# Patient Record
Sex: FEMALE | Race: WHITE | Employment: UNEMPLOYED | URBAN - METROPOLITAN AREA
[De-identification: names, ages, dates, MRNs, and addresses within clinical notes are randomized per-mention and may not be internally consistent; named-entity substitution may affect disease eponyms.]

---

## 2024-03-07 DIAGNOSIS — E03.9 ACQUIRED HYPOTHYROIDISM: Primary | ICD-10-CM

## 2024-03-07 RX ORDER — SOTALOL HYDROCHLORIDE 80 MG/1
120 TABLET ORAL 2 TIMES DAILY
COMMUNITY

## 2024-03-07 RX ORDER — SACUBITRIL AND VALSARTAN 24; 26 MG/1; MG/1
1 TABLET, FILM COATED ORAL 2 TIMES DAILY
COMMUNITY

## 2024-03-07 RX ORDER — ASPIRIN 81 MG/1
81 TABLET ORAL
COMMUNITY
Start: 2023-08-07

## 2024-03-07 RX ORDER — LEVOTHYROXINE SODIUM 112 UG/1
112 TABLET ORAL DAILY
COMMUNITY
End: 2024-03-07 | Stop reason: SDUPTHER

## 2024-03-07 RX ORDER — SPIRONOLACTONE 25 MG/1
25 TABLET ORAL DAILY
COMMUNITY

## 2024-03-07 RX ORDER — MEXILETINE HYDROCHLORIDE 150 MG/1
150 CAPSULE ORAL 3 TIMES DAILY
COMMUNITY

## 2024-03-07 RX ORDER — METOPROLOL SUCCINATE 50 MG/1
50 TABLET, EXTENDED RELEASE ORAL
COMMUNITY
Start: 2024-03-05

## 2024-03-07 RX ORDER — OMEPRAZOLE 20 MG/1
20 CAPSULE, DELAYED RELEASE ORAL
COMMUNITY
Start: 2023-04-24

## 2024-03-08 RX ORDER — LEVOTHYROXINE SODIUM 112 UG/1
112 TABLET ORAL DAILY
Qty: 90 TABLET | Refills: 0 | Status: SHIPPED | OUTPATIENT
Start: 2024-03-08

## 2024-08-28 ENCOUNTER — TELEPHONE (OUTPATIENT)
Dept: PRIMARY CARE | Facility: CLINIC | Age: 69
End: 2024-08-28
Payer: MEDICARE

## 2024-08-28 DIAGNOSIS — E03.9 ACQUIRED HYPOTHYROIDISM: ICD-10-CM

## 2024-08-28 DIAGNOSIS — Z00.00 WELLNESS EXAMINATION: Primary | ICD-10-CM

## 2024-08-28 RX ORDER — LEVOTHYROXINE SODIUM 112 UG/1
112 TABLET ORAL DAILY
Qty: 30 TABLET | Refills: 0 | Status: SHIPPED | OUTPATIENT
Start: 2024-08-28

## 2024-08-28 NOTE — TELEPHONE ENCOUNTER
Pt came in today stating that she needs a refill of her levothyroxine. She stated that she had her add a TSH to check her thyroid level but there is nothing in the chart and she has not been seen in over a year.

## 2024-09-23 ENCOUNTER — APPOINTMENT (OUTPATIENT)
Dept: PRIMARY CARE | Facility: CLINIC | Age: 69
End: 2024-09-23
Payer: MEDICARE

## 2024-09-23 VITALS
BODY MASS INDEX: 27.11 KG/M2 | DIASTOLIC BLOOD PRESSURE: 50 MMHG | WEIGHT: 153 LBS | HEART RATE: 104 BPM | TEMPERATURE: 97.7 F | SYSTOLIC BLOOD PRESSURE: 98 MMHG | HEIGHT: 63 IN

## 2024-09-23 DIAGNOSIS — D86.0 SARCOIDOSIS OF LUNG (MULTI): ICD-10-CM

## 2024-09-23 DIAGNOSIS — R21 RASH: ICD-10-CM

## 2024-09-23 DIAGNOSIS — E03.9 ACQUIRED HYPOTHYROIDISM: ICD-10-CM

## 2024-09-23 DIAGNOSIS — Z00.00 MEDICARE ANNUAL WELLNESS VISIT, SUBSEQUENT: Primary | ICD-10-CM

## 2024-09-23 DIAGNOSIS — Z12.11 ENCOUNTER FOR SCREENING FOR MALIGNANT NEOPLASM OF COLON: ICD-10-CM

## 2024-09-23 PROCEDURE — 99497 ADVNCD CARE PLAN 30 MIN: CPT | Performed by: FAMILY MEDICINE

## 2024-09-23 PROCEDURE — 99214 OFFICE O/P EST MOD 30 MIN: CPT | Performed by: FAMILY MEDICINE

## 2024-09-23 PROCEDURE — 1123F ACP DISCUSS/DSCN MKR DOCD: CPT | Performed by: FAMILY MEDICINE

## 2024-09-23 PROCEDURE — G0444 DEPRESSION SCREEN ANNUAL: HCPCS | Performed by: FAMILY MEDICINE

## 2024-09-23 PROCEDURE — 1158F ADVNC CARE PLAN TLK DOCD: CPT | Performed by: FAMILY MEDICINE

## 2024-09-23 PROCEDURE — 3008F BODY MASS INDEX DOCD: CPT | Performed by: FAMILY MEDICINE

## 2024-09-23 PROCEDURE — G0446 INTENS BEHAVE THER CARDIO DX: HCPCS | Performed by: FAMILY MEDICINE

## 2024-09-23 PROCEDURE — 1159F MED LIST DOCD IN RCRD: CPT | Performed by: FAMILY MEDICINE

## 2024-09-23 PROCEDURE — 1036F TOBACCO NON-USER: CPT | Performed by: FAMILY MEDICINE

## 2024-09-23 PROCEDURE — G0439 PPPS, SUBSEQ VISIT: HCPCS | Performed by: FAMILY MEDICINE

## 2024-09-23 RX ORDER — CLOTRIMAZOLE AND BETAMETHASONE DIPROPIONATE 10; .64 MG/G; MG/G
1 CREAM TOPICAL 2 TIMES DAILY
Qty: 45 G | Refills: 0 | Status: SHIPPED | OUTPATIENT
Start: 2024-09-23 | End: 2024-11-22

## 2024-09-23 RX ORDER — LEVOTHYROXINE SODIUM 112 UG/1
112 TABLET ORAL DAILY
Qty: 90 TABLET | Refills: 1 | Status: SHIPPED | OUTPATIENT
Start: 2024-09-23

## 2024-09-23 ASSESSMENT — PATIENT HEALTH QUESTIONNAIRE - PHQ9
1. LITTLE INTEREST OR PLEASURE IN DOING THINGS: NOT AT ALL
SUM OF ALL RESPONSES TO PHQ9 QUESTIONS 1 AND 2: 0
2. FEELING DOWN, DEPRESSED OR HOPELESS: NOT AT ALL

## 2024-09-23 NOTE — PROGRESS NOTES
Advance Care Planning Note     Discussion Date: 09/23/24   Discussion Participants: patient    The patient wishes to discuss Advance Care Planning today and the following is a brief summary of our discussion.     Patient has capacity to make their own medical decisions: Yes  Health Care Agent/Surrogate Decision Maker documented in chart: Yes    Documents on file and valid:  Advance Directive/Living Will: Yes   Health Care Power of : Yes  Other: matilde    Communication of Medical Status/Prognosis:   tbs     Communication of Treatment Goals/Options:   tbs     Treatment Decisions  Goals of Care: survival is paramount regardless of prognosis, treatment outcome, or burden   tbs  Follow Up Plan  tbs  Team Members  tbs  Time Statement: Total face to face time spent on advance care planning was >15 minutes with 16 minutes spent in counseling, including the explanation.  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  Greater than 10 minutes discussed.  PHQ-9 greater than 5 minutes reviewed    Ariel Taylor, DO  9/23/2024 12:49 PM    Patient is here for follow-up needs refill of her thyroid medication she just had TSH redone.  Just saw her cardiologist and is stable on her blood pressure and cardiac medications.  Patient also was used to moving a bunch of brush and got a rash that was scratchy and somewhat papular on her anterior abdomen.    REVIEW OF SYSTEMS: 12 systems negative except for those mentioned in the HPI. Reviewed home risks with patient. Patient feels safe at home.    PHYSICAL EXAMINATION:   Constitutional: The patient is alert and oriented x3, in no acute  distress.  Eyes: Extraocular movements are intact. Pupils are equal and reactive to light  ENT: Bilateral TMs within normal limits. Nasal turbinates are within  normal limits. Throat within normal limits.  Neck: Supple without lymphadenopathy or JVD.  Heart: Regular rate and rhythm without murmur, click, gallop, or rub.  Lungs: Clear to auscultation bilaterally. No rales, rhonchi, or  wheezing.  Abdomen: Soft, nontender, nondistended. Normoactive bowel sounds.   No palpable masses. Normal percussion  Musculoskeletal: 5/5 motor, upper and lower extremities.  Neurologic: Cranial nerves II through XII fully intact. +2/4 DTRs  in ankle and knee.  Psychiatric: Good judgment and insight. Normal affect and mood. No cognitive defects noted.  Lymphatic: Negative as noted above.  Skin: Excoriated rash on the upper left abdomen with a linear pattern at 1 point    Assessment:  Per EMR    Plan:  Screening as discussed will also do Cologuard.  I will then follow-up in 6 months with blood work and also calcium score test.    Hypertension stable.  Thyroid slightly hyper but has been stable in the past patient wishes to retest with the  lab instead of the [facility] lab.  When put in for CBC CMP magnesium thyroid level repeat thyroid.  Of also discussed what appears to be a contact dermatitis especially the linear pattern will give steroid cream follow-up if not improved  Discussed with the patient and reviewed annual wellness questionnaire form as well as cognitive deficits. Also discussed with the patient immunizations and risks for colonoscopy and other screening procedures    This dictation was created using Dragon dictation and may contain errors

## 2024-09-24 DIAGNOSIS — Z12.31 ENCOUNTER FOR SCREENING MAMMOGRAM FOR BREAST CANCER: ICD-10-CM

## 2024-10-08 ENCOUNTER — HOSPITAL ENCOUNTER (OUTPATIENT)
Dept: RADIOLOGY | Facility: HOSPITAL | Age: 69
Discharge: HOME | End: 2024-10-08
Payer: MEDICARE

## 2024-10-08 VITALS — WEIGHT: 150 LBS | BODY MASS INDEX: 27 KG/M2

## 2024-10-08 DIAGNOSIS — Z12.31 ENCOUNTER FOR SCREENING MAMMOGRAM FOR BREAST CANCER: ICD-10-CM

## 2024-10-08 PROCEDURE — 77063 BREAST TOMOSYNTHESIS BI: CPT | Performed by: RADIOLOGY

## 2024-10-08 PROCEDURE — 77067 SCR MAMMO BI INCL CAD: CPT

## 2024-10-08 PROCEDURE — 77067 SCR MAMMO BI INCL CAD: CPT | Performed by: RADIOLOGY

## 2024-10-10 LAB — NONINV COLON CA DNA+OCC BLD SCRN STL QL: NEGATIVE

## 2024-10-14 DIAGNOSIS — Z12.31 ENCOUNTER FOR SCREENING MAMMOGRAM FOR MALIGNANT NEOPLASM OF BREAST: ICD-10-CM

## 2024-10-14 DIAGNOSIS — R92.8 ABNORMAL MAMMOGRAM OF BOTH BREASTS: Primary | ICD-10-CM

## 2024-10-22 ENCOUNTER — HOSPITAL ENCOUNTER (OUTPATIENT)
Dept: RADIOLOGY | Facility: HOSPITAL | Age: 69
Discharge: HOME | End: 2024-10-22
Payer: MEDICARE

## 2024-10-22 DIAGNOSIS — R92.8 ABNORMAL MAMMOGRAM: ICD-10-CM

## 2024-10-22 DIAGNOSIS — R92.8 ABNORMAL MAMMOGRAM OF BOTH BREASTS: ICD-10-CM

## 2024-10-22 PROCEDURE — 76642 ULTRASOUND BREAST LIMITED: CPT | Mod: RT

## 2024-10-22 PROCEDURE — 77062 BREAST TOMOSYNTHESIS BI: CPT | Mod: RSC

## 2024-10-22 PROCEDURE — 76982 USE 1ST TARGET LESION: CPT | Mod: RT

## 2024-10-22 PROCEDURE — 77061 BREAST TOMOSYNTHESIS UNI: CPT | Mod: RT

## 2024-10-23 DIAGNOSIS — R92.8 ABNORMAL MAMMOGRAM OF BOTH BREASTS: Primary | ICD-10-CM

## 2024-10-27 ENCOUNTER — APPOINTMENT (OUTPATIENT)
Dept: RADIOLOGY | Facility: HOSPITAL | Age: 69
End: 2024-10-27
Payer: MEDICARE

## 2024-12-11 ENCOUNTER — ANCILLARY PROCEDURE (OUTPATIENT)
Facility: HOSPITAL | Age: 69
End: 2024-12-11
Payer: MEDICARE

## 2024-12-11 DIAGNOSIS — Z00.00 MEDICARE ANNUAL WELLNESS VISIT, SUBSEQUENT: ICD-10-CM

## 2025-01-08 ENCOUNTER — APPOINTMENT (OUTPATIENT)
Facility: HOSPITAL | Age: 70
End: 2025-01-08
Payer: MEDICARE

## 2025-03-27 DIAGNOSIS — E03.9 ACQUIRED HYPOTHYROIDISM: ICD-10-CM

## 2025-03-27 RX ORDER — LEVOTHYROXINE SODIUM 112 UG/1
112 TABLET ORAL DAILY
Qty: 90 TABLET | Refills: 0 | Status: SHIPPED | OUTPATIENT
Start: 2025-03-27

## 2025-07-02 DIAGNOSIS — E03.9 ACQUIRED HYPOTHYROIDISM: ICD-10-CM

## 2025-07-02 RX ORDER — LEVOTHYROXINE SODIUM 112 UG/1
112 TABLET ORAL DAILY
Qty: 90 TABLET | Refills: 0 | Status: SHIPPED | OUTPATIENT
Start: 2025-07-02